# Patient Record
Sex: MALE | Race: OTHER
[De-identification: names, ages, dates, MRNs, and addresses within clinical notes are randomized per-mention and may not be internally consistent; named-entity substitution may affect disease eponyms.]

---

## 2021-04-22 ENCOUNTER — HOSPITAL ENCOUNTER (EMERGENCY)
Dept: HOSPITAL 61 - ER | Age: 31
Discharge: HOME | End: 2021-04-22
Payer: COMMERCIAL

## 2021-04-22 VITALS — WEIGHT: 169.76 LBS | BODY MASS INDEX: 25.14 KG/M2 | HEIGHT: 69 IN

## 2021-04-22 VITALS
DIASTOLIC BLOOD PRESSURE: 69 MMHG | DIASTOLIC BLOOD PRESSURE: 69 MMHG | SYSTOLIC BLOOD PRESSURE: 136 MMHG | SYSTOLIC BLOOD PRESSURE: 136 MMHG

## 2021-04-22 DIAGNOSIS — M54.5: Primary | ICD-10-CM

## 2021-04-22 LAB
APTT PPP: YELLOW S
BACTERIA #/AREA URNS HPF: 0 /HPF
BILIRUB UR QL STRIP: NEGATIVE
FIBRINOGEN PPP-MCNC: CLEAR MG/DL
NITRITE UR QL STRIP: NEGATIVE
PH UR STRIP: 6 [PH]
PROT UR STRIP-MCNC: NEGATIVE MG/DL
RBC #/AREA URNS HPF: 0 /HPF (ref 0–2)
UROBILINOGEN UR-MCNC: 0.2 MG/DL
WBC #/AREA URNS HPF: 0 /HPF (ref 0–4)

## 2021-04-22 PROCEDURE — 74176 CT ABD & PELVIS W/O CONTRAST: CPT

## 2021-04-22 PROCEDURE — 96375 TX/PRO/DX INJ NEW DRUG ADDON: CPT

## 2021-04-22 PROCEDURE — 96374 THER/PROPH/DIAG INJ IV PUSH: CPT

## 2021-04-22 PROCEDURE — 81001 URINALYSIS AUTO W/SCOPE: CPT

## 2021-04-22 PROCEDURE — 99284 EMERGENCY DEPT VISIT MOD MDM: CPT

## 2021-04-22 NOTE — RAD
Study: CT abdomen/pelvis without intravenous contrast



Indication: Flank pain.



Comparison: None.



Technique: Helical CT imaging performed of the abdomen and pelvis without the use of intravenous cont
rast. Sagittal and coronal reformats were obtained. 



One or more of the following individualized dose reduction techniques were utilized for this examinat
ion:  



1. Automated exposure control

2. Adjustment of the mA and/or kV according to patient size

3. Use of iterative reconstruction technique.



Findings:



Inherently limited evaluation without intravenous contrast.



Chest: Unremarkable visualized lungs and mediastinal contents.



Liver: Hepatic steatosis and hepatomegaly. Areas of fatty sparing best seen along the lexis hepatis a
nd gallbladder fossa.



Gallbladder/Biliary Tree: No CT evidence for acute cholecystitis. Nondilated biliary tree.



Pancreas: Unremarkable.



Spleen: Within normal limits for size.



Adrenal Glands: No mass.



Kidneys/Ureters/Bladder: No nephrolithiasis or collecting system dilatation. Symmetric size of the ki
dneys. No perinephric edema. Unremarkable bladder.



Reproductive Organs: Unremarkable prostate. 



Colon: Unremarkable.



Appendix: Unremarkable.



Small Bowel: Nonobstructed.



Stomach: Unremarkable.



Vasculature: Normal aortic caliber.



Lymph Nodes: No lymphadenopathy by size criteria.



Peritoneum and Body Wall: No free fluid or pneumoperitoneum. Small supraumbilical fat-containing avani
ia.



Bones: No acute or aggressive osseous process.



Miscellaneous: None.



Impression:



1.  No nephrolithiasis or collecting system dilatation on either side to explain the patient's report
ed flank pain.

2.  Hepatic steatosis and hepatomegaly.



Electronically signed by: JADON KNAPP MD (4/22/2021 5:29 AM) [facility]

## 2021-04-22 NOTE — PHYS DOC
Past Medical History


Past Medical History:  No Pertinent History


Past Surgical History:  No Surgical History


Smoking Status:  Never Smoker


Alcohol Use:  Occasionally





General Adult


EDM:


Chief Complaint:  BACK PAIN - NO INJURY





HPI:


HPI:





Patient is a 30  year old male who presents to the emergency department with 

lower back pain.  Patient states that the pain has been going on intermittently 

for over 2 months and was much worse this morning.  He was unable to put his 

socks on due to the pain when bending over.  He states the pain is a sharp 7 out

of 10 pain that is made worse with flexion.  Patient says the pain is deep and 

wraps around to his stomach.  He states that he has no nausea, vomiting or 

diarrhea, no fever chills.  He has had no recent trauma to the area and does not

recall any sprains or strains.





Denies IV drug abuse.


NO saddle anesthesia loss of bowel or bladder.





Review of Systems:


Constitutional symptoms-  No fever, no chills.


Eyes- No Discharge, No Visual Loss


Respiratory symptoms-  No shortness of breath, No wheezing, No Dyspnea on 

Exertion


Cardiovascular Systems; No chest pain, No Palpitations, No syncope


Gastrointestinal symptoms:  NO abdominal pain, no nausea, no vomiting or 

diarrhea.


Genitourinary symptoms:  No dysuria.  


Musculoskeletal symptoms: Positive back pain  No extremity pain.


NEUROLOGICAL Symptoms:  No headache, no generalized weakness; No focal Weakness





Heart Score:


C/O Chest Pain:  N/A


Risk Factors:


Risk Factors:  DM, Current or recent (<one month) smoker, HTN, HLP, family 

history of CAD, obesity.


Risk Scores:


Score 0 - 3:  2.5% MACE over next 6 weeks - Discharge Home


Score 4 - 6:  20.3% MACE over next 6 weeks - Admit for Clinical Observation


Score 7 - 10:  72.7% MACE over next 6 weeks - Early Invasive Strategies





Current Medications:





Current Medications








 Medications


  (Trade)  Dose


 Ordered  Sig/Katerin  Start Time


 Stop Time Status Last Admin


Dose Admin


 


 Ketorolac


 Tromethamine


  (Toradol 30mg


 Vial)  30 mg  1X  ONCE  4/22/21 05:30


 4/22/21 05:31  4/22/21 05:19


30 MG











Allergies:


Allergies:





Allergies








Coded Allergies Type Severity Reaction Last Updated Verified


 


  No Known Drug Allergies    4/22/21 No











Physical Exam:


PE:


General: alert, no acute distress.


Skin: warm, dry and intact.


Head::  Normocephalic, atraumatic.


Neck:   Trachea midline.


Eyes: EOMI, Normal conjunctiva, No drainage


CARDIOVASCULAR:  Regular rate and rhythm


RESPIRATORY:  No respiratory distress


Back:  Full range of motion.


MUSCULOSKELETAL:  Full range of motion of bilateral upper and lower extremities,

difficulty sitting up due to pain.


GASTROINTESTINAL: Abdomen soft without rebound or guarding.


NEUROLOGICAL:  Alert and noted to person, place and time.  No neurological 

deficits observed


Psychiatric:  Cooperative.  Normal judgment





Current Patient Data:


Vital Signs:





                                   Vital Signs








  Date Time  Temp Pulse Resp B/P (MAP) Pulse Ox O2 Delivery O2 Flow Rate FiO2


 


4/22/21 04:40 98.1 88 20 145/81 (102) 98 Room Air  





 98.1       











EKG:


EKG:


[]





Radiology/Procedures:


Radiology/Procedures:


[]


Impression:





1.  No nephrolithiasis or collecting system dilatation on either side to explain

 the patient's reported flank pain.


2.  Hepatic steatosis and hepatomegaly.





Course & Med Decision Making:


Course & Med Decision Making


Pertinent Labs and Imaging studies reviewed. (See chart for details)


[place]


[] Patient was evaluated for chief complaint.  Work-up consisted of radiologic 

imaging.  CT imaging no acute abnormalities.  Initial treatment included Toradol

 with no improvement.  Repeat pain control included morphine and Flexeril with 

improvement.


Pain improved post treatment.  Patient was discharged home with Ultram and 

Flexeril.  Patient to follow-up with his primary care physician.





Hilda Disclaimer:


Dragon Disclaimer:


This electronic medical record was generated, in whole or in part, using a voice

 recognition dictation system.





Departure


Departure


Impression:  


   Primary Impression:  


   Back pain


Disposition:  01 HOME / SELF CARE / HOMELESS


Condition:  STABLE


Referrals:  


NO PCP (PCP)


Patient Instructions:  Back Pain, Adult


Scripts


Tramadol Hcl (ULTRAM) 50 Mg Tablet


50 MG PO Q4HRS PRN for PAIN, #20 TAB 0 Refills


   Prov: DANIEL VALLADARES DO         4/22/21 


Cyclobenzaprine Hcl (CYCLOBENZAPRINE HCL) 10 Mg Tablet


10 MG PO TID, #20 TAB


   Prov: DANIEL VALLADARES DO         4/22/21











DANIEL VALLADARES DO            Apr 22, 2021 05:24

## 2021-10-22 ENCOUNTER — HOSPITAL ENCOUNTER (OUTPATIENT)
Dept: HOSPITAL 61 - CT | Age: 31
End: 2021-10-22
Attending: SURGERY
Payer: COMMERCIAL

## 2021-10-22 DIAGNOSIS — R10.32: Primary | ICD-10-CM

## 2021-10-22 PROCEDURE — 72192 CT PELVIS W/O DYE: CPT

## 2021-10-22 NOTE — RAD
EXAMINATION:  CT PELVIS WO, 10/22/2021 11:54 AM



CLINICAL INDICATION:  Left groin pain



COMPARISON: CT abdomen pelvis 4/22/2021



TECHNIQUE: Helical CT imaging performed of the pelvis without the use of intravenous contrast. Sagitt
al and coronal reformats were obtained. 



One or more of the following individualized dose reduction techniques were utilized for this examinat
ion:  



1. Automated exposure control

2. Adjustment of the mA and/or kV according to patient size

3. Use of iterative reconstruction technique.



FINDINGS: No inguinal hernia. Visualized portion of the small bowel and colon are normal. The appendi
x is normal. Distal ureters, bladder, prostate gland are normal. There is no visualized lymphadenopat
hy. There are two 1 cm calcifications in the left hemiscrotum just deep to the skin, likely scrotal p
serge. No acute osseous abnormality. 



IMPRESSION: 

1. No inguinal hernia.

2. Two 1 cm calcifications along the surface of the left hemiscrotum, likely scrotal pearls. 



Electronically signed by: Darcy Melendez MD (10/22/2021 3:51 PM) [facility]